# Patient Record
Sex: MALE | Race: OTHER | Employment: UNEMPLOYED | URBAN - METROPOLITAN AREA
[De-identification: names, ages, dates, MRNs, and addresses within clinical notes are randomized per-mention and may not be internally consistent; named-entity substitution may affect disease eponyms.]

---

## 2019-02-06 ENCOUNTER — APPOINTMENT (OUTPATIENT)
Dept: GENERAL RADIOLOGY | Age: 39
End: 2019-02-06
Attending: EMERGENCY MEDICINE
Payer: SELF-PAY

## 2019-02-06 ENCOUNTER — HOSPITAL ENCOUNTER (EMERGENCY)
Age: 39
Discharge: HOME | End: 2019-02-06
Attending: EMERGENCY MEDICINE
Payer: SELF-PAY

## 2019-02-06 VITALS
HEIGHT: 65 IN | RESPIRATION RATE: 14 BRPM | DIASTOLIC BLOOD PRESSURE: 80 MMHG | OXYGEN SATURATION: 100 % | SYSTOLIC BLOOD PRESSURE: 137 MMHG | HEART RATE: 81 BPM | BODY MASS INDEX: 24.32 KG/M2 | TEMPERATURE: 98.2 F | WEIGHT: 146 LBS

## 2019-02-06 DIAGNOSIS — L03.031 PARONYCHIA OF GREAT TOE, RIGHT: Primary | ICD-10-CM

## 2019-02-06 PROCEDURE — 74011250636 HC RX REV CODE- 250/636: Performed by: NURSE PRACTITIONER

## 2019-02-06 PROCEDURE — 74011250637 HC RX REV CODE- 250/637: Performed by: NURSE PRACTITIONER

## 2019-02-06 PROCEDURE — 73660 X-RAY EXAM OF TOE(S): CPT

## 2019-02-06 PROCEDURE — 75810000106 HC EVAC SUBUNGUAL HEMATOMA

## 2019-02-06 PROCEDURE — 99283 EMERGENCY DEPT VISIT LOW MDM: CPT

## 2019-02-06 RX ORDER — CEPHALEXIN 500 MG/1
500 CAPSULE ORAL 4 TIMES DAILY
Qty: 28 CAP | Refills: 0 | Status: SHIPPED | OUTPATIENT
Start: 2019-02-06 | End: 2019-02-13

## 2019-02-06 RX ORDER — LIDOCAINE HYDROCHLORIDE 10 MG/ML
10 INJECTION, SOLUTION EPIDURAL; INFILTRATION; INTRACAUDAL; PERINEURAL ONCE
Status: COMPLETED | OUTPATIENT
Start: 2019-02-06 | End: 2019-02-06

## 2019-02-06 RX ORDER — IBUPROFEN 600 MG/1
600 TABLET ORAL
Status: COMPLETED | OUTPATIENT
Start: 2019-02-06 | End: 2019-02-06

## 2019-02-06 RX ORDER — SULFAMETHOXAZOLE AND TRIMETHOPRIM 800; 160 MG/1; MG/1
1 TABLET ORAL 2 TIMES DAILY
Qty: 14 TAB | Refills: 0 | Status: SHIPPED | OUTPATIENT
Start: 2019-02-06 | End: 2019-02-13

## 2019-02-06 RX ORDER — IBUPROFEN 600 MG/1
600 TABLET ORAL
Qty: 20 TAB | Refills: 0 | Status: SHIPPED | OUTPATIENT
Start: 2019-02-06

## 2019-02-06 RX ADMIN — IBUPROFEN 600 MG: 600 TABLET ORAL at 23:21

## 2019-02-06 RX ADMIN — LIDOCAINE HYDROCHLORIDE 10 ML: 10 INJECTION, SOLUTION EPIDURAL; INFILTRATION; INTRACAUDAL; PERINEURAL at 23:22

## 2019-02-07 NOTE — ED PROVIDER NOTES
45 y.o. male with no significant past medical history, presents ambulatory to the ED accompanied by a family member, with chief complaint of right great toe pain. Patient states that he has recently been using a topical cream to treat a suspected fungal infection of the right great toe. Then two days ago he was cutting down a tree when a branch fell on the dorsal aspect of the right great toe. He felt immediate pain to the digit when this injury occurred, and the pain has been progressively worsening in severity since then. Now in addition to the pain, patient complains of redness and swelling to the right great toe. He notes the skin has been draining around the nailbed. Patient rates his current level of discomfort in the area as a 9/10 in severity. He denies any other injuries or areas of concern, and he specifically denies fevers or chills. There are no other acute medical concerns at this time. Social hx: Patient works as a . PCP: Calvin, Not On File Note written by Noel King. Alpa Wasserman, as dictated by Ute Benson NP 10:04 PM  
 
 
The history is provided by the patient and a relative. No  was used. No past medical history on file. No past surgical history on file. No family history on file. Social History Socioeconomic History  Marital status:  Spouse name: Not on file  Number of children: Not on file  Years of education: Not on file  Highest education level: Not on file Social Needs  Financial resource strain: Not on file  Food insecurity - worry: Not on file  Food insecurity - inability: Not on file  Transportation needs - medical: Not on file  Transportation needs - non-medical: Not on file Occupational History  Not on file Tobacco Use  Smoking status: Not on file Substance and Sexual Activity  Alcohol use: Not on file  Drug use: Not on file  Sexual activity: Not on file Other Topics Concern  Not on file Social History Narrative  Not on file ALLERGIES: Patient has no known allergies. Review of Systems Constitutional: Negative for chills and fever. Musculoskeletal: Positive for arthralgias (right great toe) and joint swelling (right great toe). Skin: Positive for color change (redness, right great toe). All other systems reviewed and are negative. Vitals:  
 02/06/19 2121 BP: 137/80 Pulse: 81 Resp: 14 Temp: 98.2 °F (36.8 °C) SpO2: 100% Weight: 66.2 kg (146 lb) Height: 5' 5\" (1.651 m) Physical Exam  
Constitutional: He appears well-developed and well-nourished. HENT:  
Head: Atraumatic. Eyes: EOM are normal.  
Neck: No tracheal deviation present. Pulmonary/Chest: Effort normal. No respiratory distress. Musculoskeletal:  
     Right foot: There is decreased range of motion, tenderness and swelling. There is no crepitus, no deformity and no laceration. Feet: 
 
Paronychia to right great toe. Toe nail with fungus. Pus draining from under nail. Fluctuant area at the base of the nail. Neurological: He is alert. Skin: Skin is warm and dry. Psychiatric: He has a normal mood and affect. His behavior is normal. Judgment and thought content normal.  
Nursing note and vitals reviewed. MDM Other Procedure Date/Time: 2/6/2019 11:10 PM 
Performed by: Luis Bueno NP Authorized by: Luis Bueno NP Consent:  
  Consent obtained:  Verbal 
  Consent given by:  Patient Risks discussed:  Incomplete drainage and bleeding Indications:  
  Indications:  Paronychia Pre-procedure details:  
  Skin preparation:  Antiseptic wash and Betadine Preparation: Patient was prepped and draped in the usual sterile fashion Anesthesia (see MAR for exact dosages): Anesthesia method:  Local infiltration Local anesthetic:  Lidocaine 1% w/o epi Post-procedure details: Patient tolerance of procedure: Tolerated well, no immediate complications Comments:  
   Minimal pus drained for paronychia Assessment & Plan:  
 
Orders Placed This Encounter  XR GREAT TOE RIGHT  ibuprofen (MOTRIN) tablet 600 mg Discussed with Sky Jackson MD,ED Provider Oracio Welhs NP 
02/06/19 
10:07 PM 
 
 
No fracture. Will need paronychia drained. ABX. Oracio Welsh NP 
02/06/19 
10:32 PM 
 
 
11:23 PM 
The patient has been reevaluated. The patient is ready for discharge. The patient's signs, symptoms, diagnosis, and discharge instructions have been discussed and the patient/ family has conveyed their understanding. The patient is to follow up as recommended or return to the ED should their symptoms worsen. Plan has been discussed and the patient is in agreement. LABORATORY TESTS: 
Labs Reviewed - No data to display IMAGING RESULTS: 
Xr Great Toe Rt Min 2 V Result Date: 2/6/2019 *PRELIMINARY REPORT* EXAM: XR GREAT TOE RT MIN 2 V INDICATION: injury. Patient states a piece of wood fell on his right great toe 2 days ago. Now has pain, swelling and redness. COMPARISON: None. FINDINGS: Three views of the right great toe demonstrate no fracture or other acute abnormality. Cortical and subcortical sclerosis in the first metatarsal distally have a nonaggressive appearance. There is no acute abnormality. Preliminary report was provided by Dr. Tom Mendez, the on-call radiologist, at 2217 hours Final report to follow. *END PRELIMINARY REPORT* MEDICATIONS GIVEN: 
Medications  
ibuprofen (MOTRIN) tablet 600 mg (600 mg Oral Given 2/6/19 3753)  
lidocaine (PF) (XYLOCAINE) 10 mg/mL (1 %) injection 10 mL (10 mL IntraDERMal Given by Provider 2/6/19 5175) IMPRESSION: 
1. Paronychia of great toe, right PLAN: 
1. Current Discharge Medication List  
  
START taking these medications Details trimethoprim-sulfamethoxazole (BACTRIM DS) 160-800 mg per tablet Take 1 Tab by mouth two (2) times a day for 7 days. Qty: 14 Tab, Refills: 0  
  
cephALEXin (KEFLEX) 500 mg capsule Take 1 Cap by mouth four (4) times daily for 7 days. Qty: 28 Cap, Refills: 0  
  
ibuprofen (MOTRIN) 600 mg tablet Take 1 Tab by mouth every six (6) hours as needed for Pain. Qty: 20 Tab, Refills: 0  
  
  
 
2. Follow-up Information Follow up With Specialties Details Why Contact Info Bsi, Not On File  Schedule an appointment as soon as possible for a visit As needed, For wound re-check Not On File (62) Patient has a PCP but that physician is not listed in [facility]. [facility] EMERGENCY DEPT Emergency Medicine  As needed, If symptoms worsen [address] [place] [address] 
[phone] 3. Return to ED for new or worsening symptoms Oracio Welsh NP

## 2019-02-07 NOTE — ED NOTES
AMBER Fenton gave and reviewed discharge instructions with the patient and caregiver. The patient and caregiver verbalized understanding. The patient and caregiver was given opportunity for questions. Patient discharged in stable condition to the waiting room via ambulatory with daughter. other

## 2019-02-07 NOTE — DISCHARGE INSTRUCTIONS
Thank you for allowing us to care for you today. Please follow-up with your Primary Care provider in the next 2-3 days if your symptoms do not improve. Plan for home:     Please call, return to the ED or see your Primary Care Provider  if there are signs or symptoms of wound infection: fevers, chills, sweating, fast heartbeat, or wounds with increased warmth, increased redness, malodor (bad smelling), purulent (pus) drainage and/or pain that is increased, new or difficult to control. No Swimming, tub baths or hot tubs until your wounds have healed. Keflex 4 times a day and bactrim twice daily for 7 days to treat the infection. Bactrim twice daily for 7 days. Drink plenty of fluids while on this medication to flush your kidneys. Epsom salt soaks twice daily to help draw out the infection. Cover the toe and keep it clean. Come back to the ER if you have worsening symptoms, fevers over 100.9, shaking chills, nausea or vomiting. Patient Education        Paroniquia: Jose Daniel Santos - [ Paronychia: Care Instructions ]  Instrucciones de cuidado  La paroniquia es alicia infección de la piel alrededor de alicia uña de un dedo de la mano o del pie. Sucede cuando entran microbios a través de un terrell en la piel. Ruperto vez el médico haya hecho alicia pequeña incisión en la brissa infectada para sacar el pus. La mayoría de casos de paroniquia mejoran en pocos días. Shaji vigile mita síntomas y [id number] [employer] los consejos de mc médico. Aunque es poco común, un renee leve puede volverse algo más humza e infectar todo mc dedo. También es posible que alicia infección regrese. La atención de seguimiento es alicia parte clave de mc tratamiento y seguridad. Asegúrese de hacer y acudir a todas las citas, y llame a mc médico si está teniendo problemas. También es alicia buena idea saber los resultados de mita exámenes y mantener alicia lista de los medicamentos que brain. ¿Cómo puede cuidarse en el hogar?   · Si mc médico le dijo cómo cuidarse la uña infectada, siga las instrucciones de mc médico. Si no le eduardo instrucciones, siga estos consejos generales:  ? Lávese la brissa con agua limpia 2 veces al día. No use peróxido de hidrógeno (agua [place]) ni alcohol, los cuales pueden retrasar la sanación. ? Puede cubrir la brissa con alicia capa delgada de vaselina y alicia venda no adherente. ? Aplíquese más vaselina y [facility] la venda según sea necesario. · Si mc médico le recetó antibióticos, tómelos según las indicaciones. No deje de tomarlos por el hecho de sentirse mejor. Debe osbaldo todos los antibióticos hasta terminarlos. · [place] un analgésico (medicamento para el dolor) de venta shyla, amado acetaminofén (Tylenol), ibuprofeno (Advil, Motrin) o naproxeno (Aleve). Ariana y siga todas las instrucciones de la [place]. · No tome dos o más analgésicos al mismo tiempo a menos que el médico se lo haya indicado. Muchos analgésicos contienen acetaminofén, es decir, Tylenol. El exceso de acetaminofén (Tylenol) puede ser dañino. · Eleve el dedo por encima del nivel de mc corazón. [place] ayuda a reducir la hinchazón y el dolor. · Aplíquese calor. Colóquese alicia bolsa de [place], alicia almohadilla térmica ajustada a baja temperatura o un paño tibio sobre el dedo. No se vaya a dormir con alicia almohadilla térmica sobre la piel. · Remoje la brissa en agua tibia dos veces al día por 15 minutos cada vez. Después de remojarla, séquese viji la brissa y aplíquese alicia capa delgada de vaselina. Póngase alicia venda nueva. ¿Cuándo debe pedir ayuda? Llame a mc médico ahora mismo o busque atención médica inmediata si:    · Tiene señales de alicia infección nueva o que empeora, tales amado:  ? Aumento del dolor, la hinchazón, la temperatura o el enrojecimiento. ? Vetas rojizas que salen de la brissa de piel infectada. ? Pus que sale de la brissa.   ? Martene Pulling especial atención a los cambios en mc shalini y asegúrese de comunicarse con mc médico si:    · No mejora amado se esperaba. ¿Dónde puede encontrar más información en inglés? Roxy Mo a http://wiley-manuel.info/. Escriba C435 en la búsqueda para aprender más acerca de \"Paroniquia: Instrucciones de cuidado - [ Paronychia: Care Instructions ]. \"  Revisado: 17 dina, 2018  Versión del contenido: 11.9  © 8837-9854 [employer]. Las instrucciones de cuidado fueron adaptadas bajo licencia por [employer] (which disclaims liability or warranty for this information). Si usted tiene Brantley Orlinda afección médica o sobre estas instrucciones, siempre pregunte a mc profesional de shalini. [employer] niega toda garantía o responsabilidad por mc uso de esta información.

## 2019-02-07 NOTE — ED TRIAGE NOTES
The patient states a piece of wood fell on his right great toe 2 days ago, Now complains of pain swelling and redness.